# Patient Record
Sex: FEMALE | Race: WHITE | NOT HISPANIC OR LATINO | Employment: OTHER | URBAN - METROPOLITAN AREA
[De-identification: names, ages, dates, MRNs, and addresses within clinical notes are randomized per-mention and may not be internally consistent; named-entity substitution may affect disease eponyms.]

---

## 2024-06-27 ENCOUNTER — TELEPHONE (OUTPATIENT)
Dept: TRANSPLANT | Facility: HOSPITAL | Age: 64
End: 2024-06-27

## 2024-11-24 NOTE — TELEPHONE ENCOUNTER
Late Entry - Spoke with Emilia. She states she did not want to continue the process with us and stated she was already in the process with [facility] where her Rec[facility] is.  Per pt request referral closed for    Walk in